# Patient Record
Sex: FEMALE | Race: WHITE | HISPANIC OR LATINO | Employment: FULL TIME | URBAN - NONMETROPOLITAN AREA
[De-identification: names, ages, dates, MRNs, and addresses within clinical notes are randomized per-mention and may not be internally consistent; named-entity substitution may affect disease eponyms.]

---

## 2023-07-31 ENCOUNTER — OFFICE VISIT (OUTPATIENT)
Dept: URGENT CARE | Facility: CLINIC | Age: 28
End: 2023-07-31
Payer: COMMERCIAL

## 2023-07-31 VITALS
OXYGEN SATURATION: 99 % | RESPIRATION RATE: 18 BRPM | DIASTOLIC BLOOD PRESSURE: 87 MMHG | SYSTOLIC BLOOD PRESSURE: 116 MMHG | TEMPERATURE: 97.7 F | HEART RATE: 85 BPM

## 2023-07-31 DIAGNOSIS — L50.9 URTICARIA: Primary | ICD-10-CM

## 2023-07-31 PROCEDURE — 99213 OFFICE O/P EST LOW 20 MIN: CPT | Performed by: PHYSICIAN ASSISTANT

## 2023-07-31 RX ORDER — FAMOTIDINE 20 MG/1
20 TABLET, FILM COATED ORAL 2 TIMES DAILY
Qty: 10 TABLET | Refills: 0 | Status: SHIPPED | OUTPATIENT
Start: 2023-07-31 | End: 2023-08-05

## 2023-07-31 RX ORDER — PREDNISONE 10 MG/1
TABLET ORAL
Qty: 20 TABLET | Refills: 0 | Status: SHIPPED | OUTPATIENT
Start: 2023-07-31

## 2023-07-31 NOTE — PATIENT INSTRUCTIONS
Urticaria   WHAT YOU NEED TO KNOW:   Urticaria is also called hives. Hives can change size and shape, and appear anywhere on your skin. They can be mild or severe and last from a few minutes to a few days. Hives may be a sign of a severe allergic reaction called anaphylaxis that needs immediate treatment. Urticaria that lasts longer than 6 weeks may be a chronic condition that needs long-term treatment. DISCHARGE INSTRUCTIONS:   Call your local emergency number (911 in the [address]) for signs or symptoms of anaphylaxis,  such as trouble breathing, swelling in your mouth or throat, or wheezing. You may also have itching, a rash, or feel like you are going to faint. Return to the emergency department if:   Your heart is beating faster than it normally does. You have cramping or severe pain in your abdomen. Call your doctor if:   You have a fever. Your skin still itches 24 hours after you take your medicine. You still have hives after 7 days. Your joints are painful and swollen. You have questions or concerns about your condition or care. Medicines: You may need any of the following:  Epinephrine  is used to treat severe allergic reactions such as anaphylaxis. Antihistamines  decrease mild symptoms such as itching or a rash. Steroids  decrease redness, pain, and swelling. Take your medicine as directed. Contact your healthcare provider if you think your medicine is not helping or if you have side effects. Tell your provider if you are allergic to any medicine. Keep a list of the medicines, vitamins, and herbs you take. Include the amounts, and when and why you take them. Bring the list or the pill bottles to follow-up visits. Carry your medicine list with you in case of an emergency. Steps to take for signs or symptoms of anaphylaxis:   Immediately  give 1 shot of epinephrine only into the outer thigh muscle. Leave the shot in place  as directed.  Your provider may recommend you leave it in place for up to 10 seconds before you remove it. This helps make sure all of the epinephrine is delivered. Call 911 and go to the emergency department,  even if the shot improved symptoms. Do not drive yourself. Bring the used epinephrine shot with you. Safety precautions to take if you are at risk for anaphylaxis:   Keep 2 shots of epinephrine with you at all times. You may need a second shot, because epinephrine only works for about 20 minutes and symptoms may return. Your provider can show you and family members how to give the shot. Check the expiration date every month and replace it before it expires. Create an action plan. Your provider can help you create a written plan that explains the allergy and an emergency plan to treat a reaction. The plan explains when to give a second epinephrine shot if symptoms return or do not improve after the first. Give copies of the action plan and emergency instructions to family members, work and school staff, and  providers. Show them how to give a shot of epinephrine. Be careful when you exercise. If you have had exercise-induced anaphylaxis, do not exercise right after you eat. Stop exercising right away if you start to develop any signs or symptoms of anaphylaxis. You may first feel tired, warm, or have itchy skin. Hives, swelling, and severe breathing problems may develop if you continue to exercise. Carry medical alert identification. Wear medical alert jewelry or carry a card that explains the allergy. Ask your provider where to get these items. Keep a record of triggers and symptoms. Record everything you eat, drink, or apply to your skin for 3 weeks. Include stressful events and what you were doing right before your hives started. Bring the record with you to follow-up visits with your provider. Manage urticaria:   Cool your skin. This may help decrease itching. Apply a cool pack to your hives.  Dip a hand towel in cool water, wring it out, and place it on your hives. You may also soak your skin in a cool oatmeal bath. Do not rub your hives. This can irritate your skin and cause more hives. Wear loose clothing. Tight clothes may irritate your skin and cause more hives. Manage stress. Stress may trigger hives, or make them worse. Learn new ways to relax, such as deep breathing. Follow up with your healthcare provider as directed:  Write down your questions so you remember to ask them during your visits. © Copyright [employer] 2022 Information is for End User's use only and may not be sold, redistributed or otherwise used for commercial purposes. The above information is an  only. It is not intended as medical advice for individual conditions or treatments. Talk to your doctor, nurse or pharmacist before following any medical regimen to see if it is safe and effective for you.

## 2023-07-31 NOTE — PROGRESS NOTES
[facility]        NAME: Peggy Fuentes is a 24 y.o. female  : 1995    MRN: [id number]  DATE: 2023  TIME: 10:55 AM    Assessment and Plan   Urticaria [L50.9]  1. Urticaria  predniSONE 10 mg tablet    famotidine (PEPCID) 20 mg tablet            Patient Instructions   Patient Instructions     Urticaria   WHAT YOU NEED TO KNOW:   Urticaria is also called hives. Hives can change size and shape, and appear anywhere on your skin. They can be mild or severe and last from a few minutes to a few days. Hives may be a sign of a severe allergic reaction called anaphylaxis that needs immediate treatment. Urticaria that lasts longer than 6 weeks may be a chronic condition that needs long-term treatment. DISCHARGE INSTRUCTIONS:   Call your local emergency number (911 in the [address]) for signs or symptoms of anaphylaxis,  such as trouble breathing, swelling in your mouth or throat, or wheezing. You may also have itching, a rash, or feel like you are going to faint. Return to the emergency department if:   • Your heart is beating faster than it normally does. • You have cramping or severe pain in your abdomen. Call your doctor if:   • You have a fever. • Your skin still itches 24 hours after you take your medicine. • You still have hives after 7 days. • Your joints are painful and swollen. • You have questions or concerns about your condition or care. Medicines: You may need any of the following:  • Epinephrine  is used to treat severe allergic reactions such as anaphylaxis. • Antihistamines  decrease mild symptoms such as itching or a rash. • Steroids  decrease redness, pain, and swelling. • Take your medicine as directed. Contact your healthcare provider if you think your medicine is not helping or if you have side effects. Tell your provider if you are allergic to any medicine. Keep a list of the medicines, vitamins, and herbs you take.  Include the amounts, and when and why you take them. Bring the list or the pill bottles to follow-up visits. Carry your medicine list with you in case of an emergency. Steps to take for signs or symptoms of anaphylaxis:   • Immediately  give 1 shot of epinephrine only into the outer thigh muscle. • Leave the shot in place  as directed. Your provider may recommend you leave it in place for up to 10 seconds before you remove it. This helps make sure all of the epinephrine is delivered. • Call 911 and go to the emergency department,  even if the shot improved symptoms. Do not drive yourself. Bring the used epinephrine shot with you. Safety precautions to take if you are at risk for anaphylaxis:   • Keep 2 shots of epinephrine with you at all times. You may need a second shot, because epinephrine only works for about 20 minutes and symptoms may return. Your provider can show you and family members how to give the shot. Check the expiration date every month and replace it before it expires. • Create an action plan. Your provider can help you create a written plan that explains the allergy and an emergency plan to treat a reaction. The plan explains when to give a second epinephrine shot if symptoms return or do not improve after the first. Give copies of the action plan and emergency instructions to family members, work and school staff, and  providers. Show them how to give a shot of epinephrine. • Be careful when you exercise. If you have had exercise-induced anaphylaxis, do not exercise right after you eat. Stop exercising right away if you start to develop any signs or symptoms of anaphylaxis. You may first feel tired, warm, or have itchy skin. Hives, swelling, and severe breathing problems may develop if you continue to exercise. • Carry medical alert identification. Wear medical alert jewelry or carry a card that explains the allergy. Ask your provider where to get these items.          • Keep a record of triggers and symptoms. Record everything you eat, drink, or apply to your skin for 3 weeks. Include stressful events and what you were doing right before your hives started. Bring the record with you to follow-up visits with your provider. Manage urticaria:   • Cool your skin. This may help decrease itching. Apply a cool pack to your hives. Dip a hand towel in cool water, wring it out, and place it on your hives. You may also soak your skin in a cool oatmeal bath. • Do not rub your hives. This can irritate your skin and cause more hives. • Wear loose clothing. Tight clothes may irritate your skin and cause more hives. • Manage stress. Stress may trigger hives, or make them worse. Learn new ways to relax, such as deep breathing. Follow up with your healthcare provider as directed:  Write down your questions so you remember to ask them during your visits. © Copyright Rosangela Outhouse 2022 Information is for End User's use only and may not be sold, redistributed or otherwise used for commercial purposes. The above information is an  only. It is not intended as medical advice for individual conditions or treatments. Talk to your doctor, nurse or pharmacist before following any medical regimen to see if it is safe and effective for you. Follow up with PCP in 3-5 days. Proceed to  ER if symptoms worsen. Chief Complaint     Chief Complaint   Patient presents with   • Rash     Pt reports she developed rash on her arms and legs on Saturday. Patient reports she was taking weight loss medication for 3 weeks, pt stopped taking medication on Sunday thinking that was causing her rash. Pt took allegra yesterday and claritin throughout the night. Pt reports this morning she still had the rash but is better now. History of Present Illness       The patient complains of a rash over the past 7 days. She states that she first noticed the rash on her left upper arm and left lower leg. She states that she was on a weight loss medication for the past 3 weeks which was new. She thought that this may have been causing her hives so she stopped this on Saturday. She states the hives seem to improve since stopping the medication but still has the rash. She denies associated swelling of her lips, swelling of her face, redness of breath, or swelling of her throat. She states that she did have hives when she was younger. She did take 1 dose of Benadryl and 1 dose of Claritin for her symptoms. Rash  This is a new problem. The current episode started in the past 7 days. The problem has been waxing and waning since onset. The affected locations include the left arm, left elbow, left hand, left wrist, left fingers, left hip, left upper leg, left leg, left ankle, left foot, right arm, right elbow, right hand, right wrist, right fingers, right hip, right upper leg, right leg, right ankle, right foot and right toes. The rash is characterized by swelling and itchiness. She was exposed to a new medication. Pertinent negatives include no anorexia, congestion, cough, diarrhea, facial edema, fatigue, fever, joint pain, rhinorrhea, shortness of breath, sore throat or vomiting. Review of Systems   Review of Systems   Constitutional: Negative for fatigue and fever. HENT: Negative for congestion, dental problem, ear pain, facial swelling, hearing loss, mouth sores, nosebleeds, postnasal drip, rhinorrhea, sinus pressure, sneezing and sore throat. Eyes: Negative for pain. Respiratory: Negative for cough and shortness of breath. Gastrointestinal: Negative for anorexia, diarrhea and vomiting. Musculoskeletal: Negative for joint pain. Skin: Positive for rash.          Current Medications       Current Outpatient Medications:   •  famotidine (PEPCID) 20 mg tablet, Take 1 tablet (20 mg total) by mouth 2 (two) times a day for 5 days, Disp: 10 tablet, Rfl: 0  •  predniSONE 10 mg tablet, 4 po for 2 days, then 3x2, 2x2, and 1x2, Disp: 20 tablet, Rfl: 0    Current Allergies     Allergies as of 07/31/2023   • (No Known Allergies)            The following portions of the patient's history were reviewed and updated as appropriate: allergies, current medications, past family history, past medical history, past social history, past surgical history and problem list.     History reviewed. No pertinent past medical history. Past Surgical History:   Procedure Laterality Date   • APPENDECTOMY     • CHOLECYSTECTOMY         History reviewed. No pertinent family history. Medications have been verified. Objective   /87   Pulse 85   Temp 97.7 °F (36.5 °C)   Resp 18   SpO2 99%        Physical Exam     Physical Exam  Constitutional:       Appearance: She is well-developed. She is not diaphoretic. HENT:      Head: Normocephalic. Nose: No congestion or rhinorrhea. Mouth/Throat:      Mouth: No angioedema. Dentition: No gingival swelling. Pharynx: No oropharyngeal exudate, posterior oropharyngeal erythema or uvula swelling. Eyes:      General:         Right eye: No discharge. Left eye: No discharge. Pupils: Pupils are equal, round, and reactive to light. Neck:      Thyroid: No thyromegaly. Cardiovascular:      Rate and Rhythm: Normal rate. Heart sounds: No murmur heard. Pulmonary:      Effort: Pulmonary effort is normal. No respiratory distress. Breath sounds: No wheezing, rhonchi or rales. Chest:      Chest wall: No tenderness. Abdominal:      General: There is no distension. Palpations: Abdomen is soft. Tenderness: There is no abdominal tenderness. There is no guarding or rebound. Musculoskeletal:         General: Normal range of motion. Cervical back: Normal range of motion. Lymphadenopathy:      Cervical: No cervical adenopathy. Skin:     General: Skin is warm. Findings: Rash present. Comments:  There is raised urticaria noted on the left upper arm. There is also an area urticaria noted on the left lower leg and left upper leg. Neurological:      Mental Status: She is alert and oriented to person, place, and time. Answers for HPI/ROS submitted by the patient on 7/31/2023  nail changes: No    -The patient will be treated with prednisone and famotidine. I suggest follow-up with her PCP if symptoms fail to improve. I suggest ER if symptoms worsen.